# Patient Record
Sex: FEMALE
[De-identification: names, ages, dates, MRNs, and addresses within clinical notes are randomized per-mention and may not be internally consistent; named-entity substitution may affect disease eponyms.]

---

## 2018-10-02 ENCOUNTER — RESULT REVIEW (OUTPATIENT)
Age: 28
End: 2018-10-02

## 2020-01-28 ENCOUNTER — RESULT REVIEW (OUTPATIENT)
Age: 30
End: 2020-01-28

## 2021-02-02 ENCOUNTER — TRANSCRIPTION ENCOUNTER (OUTPATIENT)
Age: 31
End: 2021-02-02

## 2021-03-02 ENCOUNTER — TRANSCRIPTION ENCOUNTER (OUTPATIENT)
Age: 31
End: 2021-03-02

## 2021-03-16 ENCOUNTER — TRANSCRIPTION ENCOUNTER (OUTPATIENT)
Age: 31
End: 2021-03-16

## 2021-05-13 ENCOUNTER — RESULT REVIEW (OUTPATIENT)
Age: 31
End: 2021-05-13

## 2021-05-27 ENCOUNTER — RESULT REVIEW (OUTPATIENT)
Age: 31
End: 2021-05-27

## 2021-10-11 ENCOUNTER — TRANSCRIPTION ENCOUNTER (OUTPATIENT)
Age: 31
End: 2021-10-11

## 2022-02-24 PROBLEM — Z00.00 ENCOUNTER FOR PREVENTIVE HEALTH EXAMINATION: Status: ACTIVE | Noted: 2022-02-24

## 2022-03-24 ENCOUNTER — APPOINTMENT (OUTPATIENT)
Dept: ANTEPARTUM | Facility: CLINIC | Age: 32
End: 2022-03-24
Payer: COMMERCIAL

## 2022-03-24 ENCOUNTER — ASOB RESULT (OUTPATIENT)
Age: 32
End: 2022-03-24

## 2022-03-24 PROCEDURE — 76811 OB US DETAILED SNGL FETUS: CPT

## 2022-08-13 ENCOUNTER — TRANSCRIPTION ENCOUNTER (OUTPATIENT)
Age: 32
End: 2022-08-13

## 2022-08-14 ENCOUNTER — INPATIENT (INPATIENT)
Facility: HOSPITAL | Age: 32
LOS: 3 days | Discharge: HOME | End: 2022-08-18
Attending: OBSTETRICS & GYNECOLOGY | Admitting: OBSTETRICS & GYNECOLOGY
Payer: COMMERCIAL

## 2022-08-14 ENCOUNTER — TRANSCRIPTION ENCOUNTER (OUTPATIENT)
Age: 32
End: 2022-08-14

## 2022-08-14 VITALS
SYSTOLIC BLOOD PRESSURE: 142 MMHG | HEART RATE: 78 BPM | DIASTOLIC BLOOD PRESSURE: 77 MMHG | TEMPERATURE: 98 F | RESPIRATION RATE: 16 BRPM

## 2022-08-14 DIAGNOSIS — Z34.80 ENCOUNTER FOR SUPERVISION OF OTHER NORMAL PREGNANCY, UNSPECIFIED TRIMESTER: ICD-10-CM

## 2022-08-14 DIAGNOSIS — O26.899 OTHER SPECIFIED PREGNANCY RELATED CONDITIONS, UNSPECIFIED TRIMESTER: ICD-10-CM

## 2022-08-14 DIAGNOSIS — Z3A.00 WEEKS OF GESTATION OF PREGNANCY NOT SPECIFIED: ICD-10-CM

## 2022-08-14 LAB
ALBUMIN SERPL ELPH-MCNC: 3.3 G/DL — SIGNIFICANT CHANGE UP (ref 3.3–5)
ALBUMIN SERPL ELPH-MCNC: 3.6 G/DL — SIGNIFICANT CHANGE UP (ref 3.3–5)
ALBUMIN SERPL ELPH-MCNC: 3.9 G/DL — SIGNIFICANT CHANGE UP (ref 3.3–5)
ALP SERPL-CCNC: 214 U/L — HIGH (ref 40–120)
ALP SERPL-CCNC: 253 U/L — HIGH (ref 40–120)
ALP SERPL-CCNC: 256 U/L — HIGH (ref 40–120)
ALT FLD-CCNC: 13 U/L — SIGNIFICANT CHANGE UP (ref 10–45)
ALT FLD-CCNC: 13 U/L — SIGNIFICANT CHANGE UP (ref 10–45)
ALT FLD-CCNC: 15 U/L — SIGNIFICANT CHANGE UP (ref 10–45)
ANION GAP SERPL CALC-SCNC: 14 MMOL/L — SIGNIFICANT CHANGE UP (ref 5–17)
ANION GAP SERPL CALC-SCNC: 15 MMOL/L — SIGNIFICANT CHANGE UP (ref 5–17)
ANION GAP SERPL CALC-SCNC: 18 MMOL/L — HIGH (ref 5–17)
APPEARANCE UR: CLEAR — SIGNIFICANT CHANGE UP
APTT BLD: 25.4 SEC — LOW (ref 27.5–35.5)
APTT BLD: 25.9 SEC — LOW (ref 27.5–35.5)
APTT BLD: 27.6 SEC — SIGNIFICANT CHANGE UP (ref 27.5–35.5)
AST SERPL-CCNC: 26 U/L — SIGNIFICANT CHANGE UP (ref 10–40)
AST SERPL-CCNC: 26 U/L — SIGNIFICANT CHANGE UP (ref 10–40)
AST SERPL-CCNC: 36 U/L — SIGNIFICANT CHANGE UP (ref 10–40)
BACTERIA # UR AUTO: NEGATIVE — SIGNIFICANT CHANGE UP
BASOPHILS # BLD AUTO: 0.01 K/UL — SIGNIFICANT CHANGE UP (ref 0–0.2)
BASOPHILS # BLD AUTO: 0.02 K/UL — SIGNIFICANT CHANGE UP (ref 0–0.2)
BASOPHILS # BLD AUTO: 0.03 K/UL — SIGNIFICANT CHANGE UP (ref 0–0.2)
BASOPHILS NFR BLD AUTO: 0.1 % — SIGNIFICANT CHANGE UP (ref 0–2)
BASOPHILS NFR BLD AUTO: 0.2 % — SIGNIFICANT CHANGE UP (ref 0–2)
BASOPHILS NFR BLD AUTO: 0.4 % — SIGNIFICANT CHANGE UP (ref 0–2)
BILIRUB SERPL-MCNC: 0.5 MG/DL — SIGNIFICANT CHANGE UP (ref 0.2–1.2)
BILIRUB UR-MCNC: NEGATIVE — SIGNIFICANT CHANGE UP
BLD GP AB SCN SERPL QL: NEGATIVE — SIGNIFICANT CHANGE UP
BUN SERPL-MCNC: 12 MG/DL — SIGNIFICANT CHANGE UP (ref 7–23)
BUN SERPL-MCNC: 13 MG/DL — SIGNIFICANT CHANGE UP (ref 7–23)
BUN SERPL-MCNC: 9 MG/DL — SIGNIFICANT CHANGE UP (ref 7–23)
CALCIUM SERPL-MCNC: 8.5 MG/DL — SIGNIFICANT CHANGE UP (ref 8.4–10.5)
CALCIUM SERPL-MCNC: 8.8 MG/DL — SIGNIFICANT CHANGE UP (ref 8.4–10.5)
CALCIUM SERPL-MCNC: 9.1 MG/DL — SIGNIFICANT CHANGE UP (ref 8.4–10.5)
CHLORIDE SERPL-SCNC: 104 MMOL/L — SIGNIFICANT CHANGE UP (ref 96–108)
CHLORIDE SERPL-SCNC: 104 MMOL/L — SIGNIFICANT CHANGE UP (ref 96–108)
CHLORIDE SERPL-SCNC: 105 MMOL/L — SIGNIFICANT CHANGE UP (ref 96–108)
CO2 SERPL-SCNC: 17 MMOL/L — LOW (ref 22–31)
CO2 SERPL-SCNC: 18 MMOL/L — LOW (ref 22–31)
CO2 SERPL-SCNC: 20 MMOL/L — LOW (ref 22–31)
COLOR SPEC: SIGNIFICANT CHANGE UP
COVID-19 SPIKE DOMAIN AB INTERP: POSITIVE
COVID-19 SPIKE DOMAIN ANTIBODY RESULT: >250 U/ML — HIGH
CREAT ?TM UR-MCNC: 29 MG/DL — SIGNIFICANT CHANGE UP
CREAT SERPL-MCNC: 0.79 MG/DL — SIGNIFICANT CHANGE UP (ref 0.5–1.3)
CREAT SERPL-MCNC: 0.82 MG/DL — SIGNIFICANT CHANGE UP (ref 0.5–1.3)
CREAT SERPL-MCNC: 0.92 MG/DL — SIGNIFICANT CHANGE UP (ref 0.5–1.3)
DIFF PNL FLD: ABNORMAL
EGFR: 101 ML/MIN/1.73M2 — SIGNIFICANT CHANGE UP
EGFR: 84 ML/MIN/1.73M2 — SIGNIFICANT CHANGE UP
EGFR: 96 ML/MIN/1.73M2 — SIGNIFICANT CHANGE UP
EOSINOPHIL # BLD AUTO: 0 K/UL — SIGNIFICANT CHANGE UP (ref 0–0.5)
EOSINOPHIL # BLD AUTO: 0 K/UL — SIGNIFICANT CHANGE UP (ref 0–0.5)
EOSINOPHIL # BLD AUTO: 0.04 K/UL — SIGNIFICANT CHANGE UP (ref 0–0.5)
EOSINOPHIL NFR BLD AUTO: 0 % — SIGNIFICANT CHANGE UP (ref 0–6)
EOSINOPHIL NFR BLD AUTO: 0 % — SIGNIFICANT CHANGE UP (ref 0–6)
EOSINOPHIL NFR BLD AUTO: 0.5 % — SIGNIFICANT CHANGE UP (ref 0–6)
EPI CELLS # UR: 1 /HPF — SIGNIFICANT CHANGE UP
FIBRINOGEN PPP-MCNC: 609 MG/DL — HIGH (ref 330–520)
FIBRINOGEN PPP-MCNC: 637 MG/DL — HIGH (ref 330–520)
FIBRINOGEN PPP-MCNC: 647 MG/DL — HIGH (ref 330–520)
GLUCOSE SERPL-MCNC: 130 MG/DL — HIGH (ref 70–99)
GLUCOSE SERPL-MCNC: 151 MG/DL — HIGH (ref 70–99)
GLUCOSE SERPL-MCNC: 81 MG/DL — SIGNIFICANT CHANGE UP (ref 70–99)
GLUCOSE UR QL: NEGATIVE — SIGNIFICANT CHANGE UP
HCT VFR BLD CALC: 33.2 % — LOW (ref 34.5–45)
HCT VFR BLD CALC: 34.9 % — SIGNIFICANT CHANGE UP (ref 34.5–45)
HCT VFR BLD CALC: 35.3 % — SIGNIFICANT CHANGE UP (ref 34.5–45)
HGB BLD-MCNC: 10.9 G/DL — LOW (ref 11.5–15.5)
HGB BLD-MCNC: 11.7 G/DL — SIGNIFICANT CHANGE UP (ref 11.5–15.5)
HGB BLD-MCNC: 11.8 G/DL — SIGNIFICANT CHANGE UP (ref 11.5–15.5)
HYALINE CASTS # UR AUTO: 0 /LPF — SIGNIFICANT CHANGE UP (ref 0–2)
IMM GRANULOCYTES NFR BLD AUTO: 0.3 % — SIGNIFICANT CHANGE UP (ref 0–1.5)
IMM GRANULOCYTES NFR BLD AUTO: 0.4 % — SIGNIFICANT CHANGE UP (ref 0–1.5)
IMM GRANULOCYTES NFR BLD AUTO: 0.5 % — SIGNIFICANT CHANGE UP (ref 0–1.5)
INR BLD: 0.91 RATIO — SIGNIFICANT CHANGE UP (ref 0.88–1.16)
INR BLD: 0.92 RATIO — SIGNIFICANT CHANGE UP (ref 0.88–1.16)
INR BLD: 0.95 RATIO — SIGNIFICANT CHANGE UP (ref 0.88–1.16)
KETONES UR-MCNC: SIGNIFICANT CHANGE UP
LDH SERPL L TO P-CCNC: 233 U/L — SIGNIFICANT CHANGE UP (ref 50–242)
LDH SERPL L TO P-CCNC: 240 U/L — SIGNIFICANT CHANGE UP (ref 50–242)
LDH SERPL L TO P-CCNC: 411 U/L — HIGH (ref 50–242)
LEUKOCYTE ESTERASE UR-ACNC: NEGATIVE — SIGNIFICANT CHANGE UP
LYMPHOCYTES # BLD AUTO: 0.66 K/UL — LOW (ref 1–3.3)
LYMPHOCYTES # BLD AUTO: 1.35 K/UL — SIGNIFICANT CHANGE UP (ref 1–3.3)
LYMPHOCYTES # BLD AUTO: 1.38 K/UL — SIGNIFICANT CHANGE UP (ref 1–3.3)
LYMPHOCYTES # BLD AUTO: 17.1 % — SIGNIFICANT CHANGE UP (ref 13–44)
LYMPHOCYTES # BLD AUTO: 18.4 % — SIGNIFICANT CHANGE UP (ref 13–44)
LYMPHOCYTES # BLD AUTO: 4.9 % — LOW (ref 13–44)
MAGNESIUM SERPL-MCNC: 4.8 MG/DL — HIGH (ref 1.6–2.6)
MCHC RBC-ENTMCNC: 30.2 PG — SIGNIFICANT CHANGE UP (ref 27–34)
MCHC RBC-ENTMCNC: 30.4 PG — SIGNIFICANT CHANGE UP (ref 27–34)
MCHC RBC-ENTMCNC: 30.5 PG — SIGNIFICANT CHANGE UP (ref 27–34)
MCHC RBC-ENTMCNC: 32.8 GM/DL — SIGNIFICANT CHANGE UP (ref 32–36)
MCHC RBC-ENTMCNC: 33.4 GM/DL — SIGNIFICANT CHANGE UP (ref 32–36)
MCHC RBC-ENTMCNC: 33.5 GM/DL — SIGNIFICANT CHANGE UP (ref 32–36)
MCV RBC AUTO: 89.9 FL — SIGNIFICANT CHANGE UP (ref 80–100)
MCV RBC AUTO: 91.2 FL — SIGNIFICANT CHANGE UP (ref 80–100)
MCV RBC AUTO: 92.5 FL — SIGNIFICANT CHANGE UP (ref 80–100)
MONOCYTES # BLD AUTO: 0.37 K/UL — SIGNIFICANT CHANGE UP (ref 0–0.9)
MONOCYTES # BLD AUTO: 0.4 K/UL — SIGNIFICANT CHANGE UP (ref 0–0.9)
MONOCYTES # BLD AUTO: 0.45 K/UL — SIGNIFICANT CHANGE UP (ref 0–0.9)
MONOCYTES NFR BLD AUTO: 2.7 % — SIGNIFICANT CHANGE UP (ref 2–14)
MONOCYTES NFR BLD AUTO: 5 % — SIGNIFICANT CHANGE UP (ref 2–14)
MONOCYTES NFR BLD AUTO: 6.1 % — SIGNIFICANT CHANGE UP (ref 2–14)
NEUTROPHILS # BLD AUTO: 12.4 K/UL — HIGH (ref 1.8–7.4)
NEUTROPHILS # BLD AUTO: 5.44 K/UL — SIGNIFICANT CHANGE UP (ref 1.8–7.4)
NEUTROPHILS # BLD AUTO: 6.24 K/UL — SIGNIFICANT CHANGE UP (ref 1.8–7.4)
NEUTROPHILS NFR BLD AUTO: 74.1 % — SIGNIFICANT CHANGE UP (ref 43–77)
NEUTROPHILS NFR BLD AUTO: 77.3 % — HIGH (ref 43–77)
NEUTROPHILS NFR BLD AUTO: 92 % — HIGH (ref 43–77)
NITRITE UR-MCNC: NEGATIVE — SIGNIFICANT CHANGE UP
NRBC # BLD: 0 /100 WBCS — SIGNIFICANT CHANGE UP (ref 0–0)
PH UR: 6.5 — SIGNIFICANT CHANGE UP (ref 5–8)
PLATELET # BLD AUTO: 102 K/UL — LOW (ref 150–400)
PLATELET # BLD AUTO: 87 K/UL — LOW (ref 150–400)
PLATELET # BLD AUTO: 87 K/UL — LOW (ref 150–400)
POTASSIUM SERPL-MCNC: 3.2 MMOL/L — LOW (ref 3.5–5.3)
POTASSIUM SERPL-MCNC: 3.6 MMOL/L — SIGNIFICANT CHANGE UP (ref 3.5–5.3)
POTASSIUM SERPL-MCNC: 3.9 MMOL/L — SIGNIFICANT CHANGE UP (ref 3.5–5.3)
POTASSIUM SERPL-SCNC: 3.2 MMOL/L — LOW (ref 3.5–5.3)
POTASSIUM SERPL-SCNC: 3.6 MMOL/L — SIGNIFICANT CHANGE UP (ref 3.5–5.3)
POTASSIUM SERPL-SCNC: 3.9 MMOL/L — SIGNIFICANT CHANGE UP (ref 3.5–5.3)
PROT ?TM UR-MCNC: 14 MG/DL — HIGH (ref 0–12)
PROT SERPL-MCNC: 5.9 G/DL — LOW (ref 6–8.3)
PROT SERPL-MCNC: 6.3 G/DL — SIGNIFICANT CHANGE UP (ref 6–8.3)
PROT SERPL-MCNC: 7 G/DL — SIGNIFICANT CHANGE UP (ref 6–8.3)
PROT UR-MCNC: SIGNIFICANT CHANGE UP
PROT/CREAT UR-RTO: 0.5 RATIO — HIGH (ref 0–0.2)
PROTHROM AB SERPL-ACNC: 10.5 SEC — SIGNIFICANT CHANGE UP (ref 10.5–13.4)
PROTHROM AB SERPL-ACNC: 10.6 SEC — SIGNIFICANT CHANGE UP (ref 10.5–13.4)
PROTHROM AB SERPL-ACNC: 10.9 SEC — SIGNIFICANT CHANGE UP (ref 10.5–13.4)
RBC # BLD: 3.59 M/UL — LOW (ref 3.8–5.2)
RBC # BLD: 3.87 M/UL — SIGNIFICANT CHANGE UP (ref 3.8–5.2)
RBC # BLD: 3.88 M/UL — SIGNIFICANT CHANGE UP (ref 3.8–5.2)
RBC # FLD: 13.1 % — SIGNIFICANT CHANGE UP (ref 10.3–14.5)
RBC # FLD: 13.2 % — SIGNIFICANT CHANGE UP (ref 10.3–14.5)
RBC # FLD: 13.2 % — SIGNIFICANT CHANGE UP (ref 10.3–14.5)
RBC CASTS # UR COMP ASSIST: 1 /HPF — SIGNIFICANT CHANGE UP (ref 0–4)
RH IG SCN BLD-IMP: POSITIVE — SIGNIFICANT CHANGE UP
SARS-COV-2 IGG+IGM SERPL QL IA: >250 U/ML — HIGH
SARS-COV-2 IGG+IGM SERPL QL IA: POSITIVE
SARS-COV-2 RNA SPEC QL NAA+PROBE: SIGNIFICANT CHANGE UP
SODIUM SERPL-SCNC: 138 MMOL/L — SIGNIFICANT CHANGE UP (ref 135–145)
SODIUM SERPL-SCNC: 138 MMOL/L — SIGNIFICANT CHANGE UP (ref 135–145)
SODIUM SERPL-SCNC: 139 MMOL/L — SIGNIFICANT CHANGE UP (ref 135–145)
SP GR SPEC: 1 — LOW (ref 1.01–1.02)
T PALLIDUM AB TITR SER: NEGATIVE — SIGNIFICANT CHANGE UP
URATE SERPL-MCNC: 7.5 MG/DL — HIGH (ref 2.5–7)
URATE SERPL-MCNC: 7.5 MG/DL — HIGH (ref 2.5–7)
URATE SERPL-MCNC: 7.7 MG/DL — HIGH (ref 2.5–7)
UROBILINOGEN FLD QL: NEGATIVE — SIGNIFICANT CHANGE UP
WBC # BLD: 13.48 K/UL — HIGH (ref 3.8–10.5)
WBC # BLD: 7.35 K/UL — SIGNIFICANT CHANGE UP (ref 3.8–10.5)
WBC # BLD: 8.07 K/UL — SIGNIFICANT CHANGE UP (ref 3.8–10.5)
WBC # FLD AUTO: 13.48 K/UL — HIGH (ref 3.8–10.5)
WBC # FLD AUTO: 7.35 K/UL — SIGNIFICANT CHANGE UP (ref 3.8–10.5)
WBC # FLD AUTO: 8.07 K/UL — SIGNIFICANT CHANGE UP (ref 3.8–10.5)
WBC UR QL: 1 /HPF — SIGNIFICANT CHANGE UP (ref 0–5)

## 2022-08-14 PROCEDURE — 88307 TISSUE EXAM BY PATHOLOGIST: CPT | Mod: 26

## 2022-08-14 RX ORDER — OXYTOCIN 10 UNIT/ML
333.33 VIAL (ML) INJECTION
Qty: 20 | Refills: 0 | Status: COMPLETED | OUTPATIENT
Start: 2022-08-14 | End: 2022-08-14

## 2022-08-14 RX ORDER — ONDANSETRON 8 MG/1
4 TABLET, FILM COATED ORAL EVERY 6 HOURS
Refills: 0 | Status: DISCONTINUED | OUTPATIENT
Start: 2022-08-14 | End: 2022-08-18

## 2022-08-14 RX ORDER — LANOLIN
1 OINTMENT (GRAM) TOPICAL EVERY 6 HOURS
Refills: 0 | Status: DISCONTINUED | OUTPATIENT
Start: 2022-08-14 | End: 2022-08-18

## 2022-08-14 RX ORDER — CITRIC ACID/SODIUM CITRATE 300-500 MG
15 SOLUTION, ORAL ORAL EVERY 6 HOURS
Refills: 0 | Status: DISCONTINUED | OUTPATIENT
Start: 2022-08-14 | End: 2022-08-14

## 2022-08-14 RX ORDER — DEXAMETHASONE 0.5 MG/5ML
4 ELIXIR ORAL EVERY 6 HOURS
Refills: 0 | Status: DISCONTINUED | OUTPATIENT
Start: 2022-08-14 | End: 2022-08-18

## 2022-08-14 RX ORDER — SIMETHICONE 80 MG/1
80 TABLET, CHEWABLE ORAL EVERY 4 HOURS
Refills: 0 | Status: DISCONTINUED | OUTPATIENT
Start: 2022-08-14 | End: 2022-08-18

## 2022-08-14 RX ORDER — CHLORHEXIDINE GLUCONATE 213 G/1000ML
1 SOLUTION TOPICAL ONCE
Refills: 0 | Status: DISCONTINUED | OUTPATIENT
Start: 2022-08-14 | End: 2022-08-14

## 2022-08-14 RX ORDER — MAGNESIUM HYDROXIDE 400 MG/1
30 TABLET, CHEWABLE ORAL
Refills: 0 | Status: DISCONTINUED | OUTPATIENT
Start: 2022-08-14 | End: 2022-08-18

## 2022-08-14 RX ORDER — SODIUM CHLORIDE 9 MG/ML
1000 INJECTION, SOLUTION INTRAVENOUS
Refills: 0 | Status: DISCONTINUED | OUTPATIENT
Start: 2022-08-14 | End: 2022-08-15

## 2022-08-14 RX ORDER — TETANUS TOXOID, REDUCED DIPHTHERIA TOXOID AND ACELLULAR PERTUSSIS VACCINE, ADSORBED 5; 2.5; 8; 8; 2.5 [IU]/.5ML; [IU]/.5ML; UG/.5ML; UG/.5ML; UG/.5ML
0.5 SUSPENSION INTRAMUSCULAR ONCE
Refills: 0 | Status: DISCONTINUED | OUTPATIENT
Start: 2022-08-14 | End: 2022-08-18

## 2022-08-14 RX ORDER — SODIUM CHLORIDE 9 MG/ML
1000 INJECTION, SOLUTION INTRAVENOUS
Refills: 0 | Status: DISCONTINUED | OUTPATIENT
Start: 2022-08-14 | End: 2022-08-14

## 2022-08-14 RX ORDER — MORPHINE SULFATE 50 MG/1
2 CAPSULE, EXTENDED RELEASE ORAL ONCE
Refills: 0 | Status: DISCONTINUED | OUTPATIENT
Start: 2022-08-14 | End: 2022-08-15

## 2022-08-14 RX ORDER — ACETAMINOPHEN 500 MG
1000 TABLET ORAL EVERY 6 HOURS
Refills: 0 | Status: DISCONTINUED | OUTPATIENT
Start: 2022-08-14 | End: 2022-08-18

## 2022-08-14 RX ORDER — MAGNESIUM SULFATE 500 MG/ML
4 VIAL (ML) INJECTION ONCE
Refills: 0 | Status: DISCONTINUED | OUTPATIENT
Start: 2022-08-14 | End: 2022-08-14

## 2022-08-14 RX ORDER — HEPARIN SODIUM 5000 [USP'U]/ML
5000 INJECTION INTRAVENOUS; SUBCUTANEOUS EVERY 12 HOURS
Refills: 0 | Status: DISCONTINUED | OUTPATIENT
Start: 2022-08-14 | End: 2022-08-18

## 2022-08-14 RX ORDER — MORPHINE SULFATE 50 MG/1
2 CAPSULE, EXTENDED RELEASE ORAL ONCE
Refills: 0 | Status: DISCONTINUED | OUTPATIENT
Start: 2022-08-14 | End: 2022-08-14

## 2022-08-14 RX ORDER — OXYTOCIN 10 UNIT/ML
333.33 VIAL (ML) INJECTION
Qty: 20 | Refills: 0 | Status: DISCONTINUED | OUTPATIENT
Start: 2022-08-14 | End: 2022-08-18

## 2022-08-14 RX ORDER — OXYTOCIN 10 UNIT/ML
4 VIAL (ML) INJECTION
Qty: 30 | Refills: 0 | Status: DISCONTINUED | OUTPATIENT
Start: 2022-08-14 | End: 2022-08-15

## 2022-08-14 RX ORDER — OXYCODONE HYDROCHLORIDE 5 MG/1
5 TABLET ORAL ONCE
Refills: 0 | Status: DISCONTINUED | OUTPATIENT
Start: 2022-08-14 | End: 2022-08-18

## 2022-08-14 RX ORDER — DIPHENHYDRAMINE HCL 50 MG
25 CAPSULE ORAL EVERY 6 HOURS
Refills: 0 | Status: DISCONTINUED | OUTPATIENT
Start: 2022-08-14 | End: 2022-08-18

## 2022-08-14 RX ORDER — ACETAMINOPHEN 500 MG
975 TABLET ORAL EVERY 6 HOURS
Refills: 0 | Status: COMPLETED | OUTPATIENT
Start: 2022-08-14 | End: 2023-07-13

## 2022-08-14 RX ORDER — MAGNESIUM SULFATE 500 MG/ML
2 VIAL (ML) INJECTION
Qty: 40 | Refills: 0 | Status: DISCONTINUED | OUTPATIENT
Start: 2022-08-14 | End: 2022-08-14

## 2022-08-14 RX ORDER — NALOXONE HYDROCHLORIDE 4 MG/.1ML
0.1 SPRAY NASAL
Refills: 0 | Status: DISCONTINUED | OUTPATIENT
Start: 2022-08-14 | End: 2022-08-18

## 2022-08-14 RX ORDER — OXYCODONE HYDROCHLORIDE 5 MG/1
5 TABLET ORAL
Refills: 0 | Status: DISCONTINUED | OUTPATIENT
Start: 2022-08-14 | End: 2022-08-18

## 2022-08-14 RX ADMIN — Medication 50 GM/HR: at 15:44

## 2022-08-14 RX ADMIN — Medication 4 MILLIUNIT(S)/MIN: at 09:57

## 2022-08-14 RX ADMIN — MORPHINE SULFATE 2 MILLIGRAM(S): 50 CAPSULE, EXTENDED RELEASE ORAL at 16:21

## 2022-08-14 RX ADMIN — Medication 1000 MILLIUNIT(S)/MIN: at 15:50

## 2022-08-14 NOTE — DISCHARGE NOTE OB - MEDICATION SUMMARY - MEDICATIONS TO TAKE
I will START or STAY ON the medications listed below when I get home from the hospital:    oxyCODONE 5 mg oral tablet  -- 1 tab(s) by mouth every 8 hours MDD:Do not exceed more than 3 doses per day   -- Caution federal law prohibits the transfer of this drug to any person other  than the person for whom it was prescribed.  It is very important that you take or use this exactly as directed.  Do not skip doses or discontinue unless directed by your doctor.  May cause drowsiness or dizziness.  This prescription cannot be refilled.  Using more of this medication than prescribed may cause serious breathing problems.    -- Indication: For Postpartum    I will START or STAY ON the medications listed below when I get home from the hospital:    blood pressure cuff  -- Take blood pressures 2x per day (morning and night) and record   Call MD if Blood pressures above 140/90   -- Indication: For hypertension    oxyCODONE 5 mg oral tablet  -- 1 tab(s) by mouth every 8 hours MDD:Do not exceed more than 3 doses per day   -- Caution federal law prohibits the transfer of this drug to any person other  than the person for whom it was prescribed.  It is very important that you take or use this exactly as directed.  Do not skip doses or discontinue unless directed by your doctor.  May cause drowsiness or dizziness.  This prescription cannot be refilled.  Using more of this medication than prescribed may cause serious breathing problems.    -- Indication: For Postpartum      mg oral tablet  -- 1 tab(s) by mouth every 6 hours  -- Indication: For pain    NIFEdipine 30 mg oral tablet, extended release  -- 1 tab(s) by mouth once a day  -- Indication: For htn

## 2022-08-14 NOTE — OB RN PATIENT PROFILE - INFANT HOME WITH MOTHER, OB PROFILE
Fluconazole Counseling:  Patient counseled regarding adverse effects of fluconazole including but not limited to headache, diarrhea, nausea, upset stomach, liver function test abnormalities, taste disturbance, and stomach pain.  There is a rare possibility of liver failure that can occur when taking fluconazole.  The patient understands that monitoring of LFTs and kidney function test may be required, especially at baseline. The patient verbalized understanding of the proper use and possible adverse effects of fluconazole.  All of the patient's questions and concerns were addressed. yes

## 2022-08-14 NOTE — DISCHARGE NOTE OB - MATERIALS PROVIDED
Guide to Postpartum Care/Shaken Baby Prevention Handout/Breastfeeding Guide and Packet/Birth Certificate Instructions

## 2022-08-14 NOTE — OB NEONATOLOGY/PEDIATRICIAN DELIVERY SUMMARY - NS_NEWBORNACONDIT_OBGYN_ALL_OB
06/13/22 1559   SLP Deferred Reason   SLP Deferred Reason Patient unavailable for evaluation  (Attempted to see pt for eval this pm however pt off the floor for other procedure. Will defer and f/u tomorrow)     
Liveborn

## 2022-08-14 NOTE — OB RN DELIVERY SUMMARY - NSSELHIDDEN_OBGYN_ALL_OB_FT
[NS_DeliveryAttending1_OBGYN_ALL_OB_FT:[id number]] [NS_DeliveryAttending1_OBGYN_ALL_OB_FT:[id number]=],[NS_DeliveryAssist1_OBGYN_ALL_OB_FT:[id number]=]

## 2022-08-14 NOTE — DISCHARGE NOTE OB - CARE PROVIDER_API CALL
Wade Marte  OBSTETRICS AND GYNECOLOGY  [address], [address]  [place] [address]  Phone: [phone]  Fax: [phone]  Follow Up Time:

## 2022-08-14 NOTE — OB PROVIDER LABOR PROGRESS NOTE - NS_SUBJECTIVE/OBJECTIVE_OBGYN_ALL_OB_FT
Written postpartum secondary to clinical duties.   CTSP for a prolonged deceleration in FHR.   I informed resident, anesthesia and charge nurse immediately that I wanted to proceed with a primary  delivery as an urgent case. The fetal heart rate returned to baseline after 2-3 minutes with terbutaline x1. This was the second time we gave her terbutaline in her labor. Patient was aware she was likely going to have a  as there was cervical edema and she was 8 cm for 2.5 hours already. She was agreeable.  We moved the patient to the OR and epidural was dosed.  was aware of the situation as well.
PA Note:  Patient seen and evaluated at bedside. Patient comfortable with epidural in place.
PA Note:  Patient seen and evaluated at bedside. Patient c/o increased pain.
R4 OB Labor Note    Pt checked for prolonged decel.  Pit discontinued. Pt positions changed.  Terb x1 administered.    Vital Signs Last 24 Hrs  T(C): 36.6 (14 Aug 2022 11:57), Max: 36.7 (14 Aug 2022 06:20)  T(F): 97.88 (14 Aug 2022 11:57), Max: 98.1 (14 Aug 2022 06:20)  HR: 95 (14 Aug 2022 12:46) (63 - 144)  BP: 112/65 (14 Aug 2022 12:31) (105/59 - 166/92)  BP(mean): --  RR: 16 (14 Aug 2022 07:51) (16 - 16)  SpO2: 96% (14 Aug 2022 12:46) (87% - 100%)    Parameters below as of 14 Aug 2022 06:58  Patient On (Oxygen Delivery Method): room air

## 2022-08-14 NOTE — DISCHARGE NOTE OB - PATIENT PORTAL LINK FT
You can access the FollowMyHealth Patient Portal offered by [facility] by registering at the following website: [email or web address]. By joining [facility]’s FollowMyHealth portal, you will also be able to view your health information using other applications (apps) compatible with our system.

## 2022-08-14 NOTE — DISCHARGE NOTE OB - PLAN OF CARE
POD 2 s/p  delivery for prolonged fetal decels and preeclampsia with severe features. She is stable for discharge on POD POD 2 s/p  delivery for prolonged fetal decels and preeclampsia with severe features. She is stable for discharge on POD #3 POD 2 s/p  delivery for prolonged fetal decels and preeclampsia with severe features. She is stable for discharge on POD #4

## 2022-08-14 NOTE — DISCHARGE NOTE OB - BREAST MILK PROVIDES COLOSTRUM THAT IS HIGH IN PROTEIN
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 Other past medical history includes hypothyroidism, previous DVT left internal jugular 2020, breast lump with benign US/biopsy. Patient was notified as a possible DCD organ became available and presented pre-transplant.  Patient was informed of the risks and benefits regarding  donor organ transplantation, and has elected to proceed.      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 Other past medical history includes hypothyroidism, previous DVT left internal jugular 2020, breast lump with benign US/biopsy.     At the time of admission, the patient reports being in her usual state of health. No recent hospitalizations, no concerns at this time. Denies fevers, chills, cough, upper respiratory symptoms, bowel or bladder changes, or other concerns. Of note, received second dose of COVID vaccine 1 month ago.     Past Medical  History    Past Medical History:   Diagnosis Date     Ascites      History of blood transfusion      Liver cirrhosis (H)      Thyroid disease        Past Surgical History   Past Surgical History:   Procedure Laterality Date      SECTION  2009     THORACENTESIS Left 2021    Procedure: THORACENTESIS;  Surgeon: Almas Irby MD;  Location: [facility]     THORACENTESIS N/A 2021    Procedure: THORACENTESIS;  Surgeon: Almas Irby MD;  Location: [facility]     THORACENTESIS N/A 03/10/2021    Procedure: THORACENTESIS;  Surgeon: Almas Irby MD;  Location: [facility]       Prior to Admission Medications   Cannot display prior to admission medications because the patient has not been admitted in this contact.     Allergies   Allergies   Allergen Reactions     Amoxicillin GI Disturbance, Diarrhea, Nausea and Nausea and Vomiting       Social History    Nonsmoker  Previous etoh use    Family History   I have reviewed this patient's family history and updated it with pertinent information if needed.   Family History   Problem Relation Age of Onset     No Known Problems Mother      Colon Cancer Father 58         age 63     Breast Cancer Maternal Grandmother         Diagnosed in her 60's     No Known Problems Maternal Grandfather      No Known Problems Paternal Grandmother      No Known Problems Paternal Grandfather      Substance Abuse Brother      No Known Problems Sister      No Known Problems Son      Substance Abuse Brother        Review of Systems   The 10 point Review of Systems is negative other than noted in the HPI or here.     Physical Exam                      Vital Signs with Ranges  Temp:  [98.1  F (36.7  C)] 98.1  F (36.7  C)  Pulse:  [99] 99  Resp:  [16] 16  BP: (130)/(58) 130/58  SpO2:  [98 %] 98 %  0 lbs 0 oz    Constitutional: awake, alert, sitting up at edge of bed, NAD  HEENT: normocephalic, EOMI, MMM  Respiratory: nonlabored breathing on room air  Cardiovascular: RRR  GI: soft, nondistended, nontender to  palpation, lower abdominal scar noted  Skin: WWP  Musculoskeletal: moves all extremities  Neurologic: AOx3, no focal deficit  Neuropsychiatric: pleasant, appropriate     Statement Selected

## 2022-08-14 NOTE — DISCHARGE NOTE OB - CARE PLAN
Principal Discharge DX:	Normal labor  Assessment and plan of treatment:	POD 2 s/p  delivery for prolonged fetal decels and preeclampsia with severe features. She is stable for discharge on POD   1 Principal Discharge DX:	Normal labor  Assessment and plan of treatment:	POD 2 s/p  delivery for prolonged fetal decels and preeclampsia with severe features. She is stable for discharge on POD #3   Principal Discharge DX:	Normal labor  Assessment and plan of treatment:	POD 2 s/p  delivery for prolonged fetal decels and preeclampsia with severe features. She is stable for discharge on POD #4

## 2022-08-14 NOTE — DISCHARGE NOTE OB - HOSPITAL COURSE
Patient was admitted in labor. During the course of her labor she developed mild range BPS. She also had decreasing platelets to define preeclampsia with severe features. She had a primary  for prolonged fetal decels  at 8 cm. She was on magnesium for 24 hours postpartum.  Patient was admitted in labor. During the course of her labor she developed mild range BPS. She also had decreasing platelets to define preeclampsia with severe features. She had a primary  for prolonged fetal decels  at 8 cm. She was on magnesium for 24 hours postpartum.   Vital signs remained stable without severe BP elevations and pain well controlled.   Tolerating diet, ambulating and voiding spontaneously.   D/c home on POD #3 with return precautions and blood pressure cuff   Patient to return to office in one week for BP check  Patient was admitted in labor. During the course of her labor she developed mild range BPS. She also had decreasing platelets to define preeclampsia with severe features. She had a primary  for prolonged fetal decels  at 8 cm. She was on magnesium for 24 hours postpartum.   Vital signs remained stable without severe BP elevations and pain well controlled.   Tolerating diet, ambulating and voiding spontaneously.   D/c home on POD #4 with return precautions and blood pressure cuff   Patient to return to office in one week for BP check

## 2022-08-14 NOTE — OB PROVIDER DELIVERY SUMMARY - NSSELHIDDEN_OBGYN_ALL_OB_FT
[NS_DeliveryAttending1_OBGYN_ALL_OB_FT:[id number]],[NS_DeliveryAssist1_OBGYN_ALL_OB_FT:[id number]=]

## 2022-08-14 NOTE — OB PROVIDER LABOR PROGRESS NOTE - ASSESSMENT
c/w resuscitative measures  restart pit when tracing improves    Nathalie R4 #labor  c/w resuscitative measures  restart pit when tracing improves    #gHTN  - pt meeting criteria by BP  - f/u P/C  - Plt 102, consider repeat    Nathalie R4

## 2022-08-14 NOTE — OB PROVIDER LABOR PROGRESS NOTE - NS_OBIHIFHRDETAILS_OBGYN_ALL_OB_FT
145/moderate variability/+accels/no decels
recovered to 130/mod/
120/moderate variability/+accels/no decels

## 2022-08-14 NOTE — OB PROVIDER H&P - ASSESSMENT
Assessment  – 33yo  @40wk presents for chief concern of ctx q5min. SVE 2.5/70/-3, patinet in early labor. No prenatal issues. GBS neg.    Plan  1. Admit to LND. Routine Labs. IVF.  2. Expectant management. Repeat VE in 2hr. If exam unchanged, will start augmentation w/ pitocin.  3. Fetus: cat 1 tracing. VTX. EFW 3800g by sono. Continuous EFM. Sono. No concerns.  4. Prenatal issues: none  5. GBS neg  6. Pain: IV pain meds/epidural PRN  7. Mild range BPs noted on presentation to triage. HELLP labs sent    Patient discussed with attending physician, Dr. Marte.  Rosa Chahal MD PGY3

## 2022-08-14 NOTE — OB NEONATOLOGY/PEDIATRICIAN DELIVERY SUMMARY - NSPEDSNEONOTESA_OBGYN_ALL_OB_FT
Baby boy born at 40+0 wks via CS for bradycardia to a 33 y/o  AB+ blood type mother. Maternal history of hypothyroidism not on medications, preeclampsia on continuous Mg during admission. No significant prenatal history. PNL nr/immune/-, GBS- on . SROM at 10:26 with clear fluids. Baby emerged vigorous, crying, was warmed, dried, suctioned, and stimulated with APGARS of 9/9. Mom would like to breast and bottle feed, consents Hep B and consents circ. EOS 0.06. Highest maternal temp 36.6.     Physical Exam:  Gen: no acute distress, +grimace  HEENT:  anterior fontanel open soft and flat, nondysmorphic facies, no cleft lip/palate, ears normal set, no ear pits or tags, nares clinically patent  Resp: Normal respiratory effort without grunting or retractions, good air entry b/l, clear to auscultation bilaterally  Cardio: Present S1/S2, regular rate and rhythm, no murmurs  Abd: soft, non tender, non distended, umbilical cord with 3 vessels  Neuro: +palmar and plantar grasp, +suck, +andrea, normal tone  Extremities: negative dolan and ortolani maneuvers, moving all extremities, no clavicular crepitus or stepoff  Skin: pink, warm  Genitals: Normal male anatomy, testicles palpable in scrotum b/l, Mo 1, anus patent

## 2022-08-14 NOTE — DISCHARGE NOTE OB - ADDITIONAL INSTRUCTIONS
Regular diet as tolerated, regular activity as tolerated, no heavy lifting for first two weeks.  Nothing per vagina: no intercourse, tampons or douching.  Call your provider if you experience fevers, chills, worsening abdominal pain, inability to urinate or worsening vaginal bleeding. Follow up with your provider in 1 week for a blood pressure check. Regular diet as tolerated, regular activity as tolerated, no heavy lifting for first two weeks.  Nothing per vagina: no intercourse, tampons or douching.  Call your provider if you experience fevers, chills, worsening abdominal pain, inability to urinate or worsening vaginal bleeding. Follow up with your provider in 1 week for a blood pressure check.  Call provider if /70 or < or 150/100 or >.  Call if having : blurred vision, severe headaches, nausea/vomiting, upper abdominal pain, elevated BPs

## 2022-08-14 NOTE — OB RN INTRAOPERATIVE NOTE - NSSELHIDDEN_OBGYN_ALL_OB_FT
[NS_DeliveryAttending1_OBGYN_ALL_OB_FT:[id number]] [NS_DeliveryAttending1_OBGYN_ALL_OB_FT:[id number]],[NS_DeliveryAssist1_OBGYN_ALL_OB_FT:[id number]=]

## 2022-08-14 NOTE — OB PROVIDER LABOR PROGRESS NOTE - ASSESSMENT
A/P:  -EFM/[place]  -Anesthesia notified for epidural redose  -Anticipate   Dr. Marte in house  Kesha Russo PA-C

## 2022-08-14 NOTE — DISCHARGE NOTE OB - NS MD DC FALL RISK RISK
For information on Fall & Injury Prevention, visit: [email or web address] OR  [email or web address] OR  https://www.cdc.gov/steadi/patient.html

## 2022-08-14 NOTE — OB PROVIDER H&P - ATTENDING COMMENTS
32yoF  @40wk presents for evaluation of Ctx that started this morning and are occurring q5min. +FM. -LOF. -VB. Pt denies any other concerns.    VE: /-2 RIYA, SROM -clear at 10:26 AM  ctx q 4 min  EFM: FHR reactive, moderate variability, accels present no decels  A/P: Labor augmentation at 40 weeks  Neg GBS  Epidural working well  pitocin augmentation  Wade Marte MD

## 2022-08-14 NOTE — OB PROVIDER H&P - NSHPPHYSICALEXAM_GEN_ALL_CORE
Objective  – Vital Signs  Vital Signs Last 24 Hrs  T(C): 36.7 (14 Aug 2022 06:20), Max: 36.7 (14 Aug 2022 06:20)  T(F): 98.1 (14 Aug 2022 06:20), Max: 98.1 (14 Aug 2022 06:20)  HR: 80 (14 Aug 2022 06:57) (69 - 86)  BP: 147/82 (14 Aug 2022 06:45) (142/77 - 147/82)  BP(mean): --  RR: 16 (14 Aug 2022 06:20) (16 - 16)  SpO2: 87% (14 Aug 2022 06:57) (87% - 96%)    – PE:   CV: RRR  Pulm: breathing comfortably on RA  Abd: gravid, nontender  Extr: moving all extremities with ease  – VE: 2.5/70/-3  – FHT: baseline 130, mod variability, +accels, -decels  – [place]: q3-5min  – EFW: 3800g by sono  – Sono: vertex

## 2022-08-14 NOTE — OB RN DELIVERY SUMMARY - NS_SEPSISRSKCALC_OBGYN_ALL_OB_FT
EOS calculated successfully. EOS Risk Factor: 0.5/1000 live births ([facility] national incidence); GA=40w;Temp=98.1; ROM=5.383; GBS='Negative'; Antibiotics='No antibiotics or any antibiotics < 2 hrs prior to birth'
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
## 2022-08-14 NOTE — OB PROVIDER DELIVERY SUMMARY - NSPROVIDERDELIVERYNOTE_OBGYN_ALL_OB_FT
viable infant, cephalic OP presentation, weight 3520g, APGARS 9/9  grossly normal uters, b/l tubes and ovaries  hysterotomy closed in 2 layers  rectus muscle reapproximated with chromic suture    754/1700/300    Dictation #: viable infant, cephalic OP presentation, weight 3520g, APGARS 9/9  grossly normal uters, b/l tubes and ovaries  hysterotomy closed in 2 layers  rectus muscle reapproximated with chromic suture    394/1700/300    Dictation #: [id number]

## 2022-08-14 NOTE — OB PROVIDER H&P - HISTORY OF PRESENT ILLNESS
Admission H&P    Subjective  HPI: 32yoF  @40wk presents for evaluation of Ctx that started this morning and are occurring q5min. +FM. -LOF. -VB. Pt denies any other concerns.    – PNC: Reports diagnosis of gestational hypothyroid, not on meds. GBS neg.  EFW 3800g by caitie.  – OBHx: primigravida  – GynHx: remote h/o abnormal pap s/p colposcopy, pap in this pregnancy wnl, denies h/o STIs, ovarian cysts or fibroids  – PMH: denies  – PSH: denies  – Psych: denies   – Social: denies   – Meds: PNV   – Allergies: NKDA  – Will accept blood transfusions? Yes

## 2022-08-14 NOTE — OB PROVIDER DELIVERY SUMMARY - NSPPHNORISK_OBGYN_ALL_OB
After evaluating the patient it has been determined they are at risk for postpartum hemorrhage.
Awake/Alert

## 2022-08-15 LAB
ALBUMIN SERPL ELPH-MCNC: 2.7 G/DL — LOW (ref 3.3–5)
ALP SERPL-CCNC: 184 U/L — HIGH (ref 40–120)
ALT FLD-CCNC: 11 U/L — SIGNIFICANT CHANGE UP (ref 10–45)
ANION GAP SERPL CALC-SCNC: 12 MMOL/L — SIGNIFICANT CHANGE UP (ref 5–17)
APTT BLD: 24.9 SEC — LOW (ref 27.5–35.5)
AST SERPL-CCNC: 33 U/L — SIGNIFICANT CHANGE UP (ref 10–40)
BASOPHILS # BLD AUTO: 0.01 K/UL — SIGNIFICANT CHANGE UP (ref 0–0.2)
BASOPHILS NFR BLD AUTO: 0.1 % — SIGNIFICANT CHANGE UP (ref 0–2)
BILIRUB SERPL-MCNC: 0.6 MG/DL — SIGNIFICANT CHANGE UP (ref 0.2–1.2)
BUN SERPL-MCNC: 8 MG/DL — SIGNIFICANT CHANGE UP (ref 7–23)
CALCIUM SERPL-MCNC: 7.9 MG/DL — LOW (ref 8.4–10.5)
CHLORIDE SERPL-SCNC: 102 MMOL/L — SIGNIFICANT CHANGE UP (ref 96–108)
CO2 SERPL-SCNC: 22 MMOL/L — SIGNIFICANT CHANGE UP (ref 22–31)
CREAT SERPL-MCNC: 0.78 MG/DL — SIGNIFICANT CHANGE UP (ref 0.5–1.3)
EGFR: 102 ML/MIN/1.73M2 — SIGNIFICANT CHANGE UP
EOSINOPHIL # BLD AUTO: 0 K/UL — SIGNIFICANT CHANGE UP (ref 0–0.5)
EOSINOPHIL NFR BLD AUTO: 0 % — SIGNIFICANT CHANGE UP (ref 0–6)
FIBRINOGEN PPP-MCNC: 609 MG/DL — HIGH (ref 330–520)
GLUCOSE SERPL-MCNC: 109 MG/DL — HIGH (ref 70–99)
HCT VFR BLD CALC: 28.3 % — LOW (ref 34.5–45)
HGB BLD-MCNC: 9.7 G/DL — LOW (ref 11.5–15.5)
IMM GRANULOCYTES NFR BLD AUTO: 0.4 % — SIGNIFICANT CHANGE UP (ref 0–1.5)
INR BLD: 0.93 RATIO — SIGNIFICANT CHANGE UP (ref 0.88–1.16)
LDH SERPL L TO P-CCNC: 393 U/L — HIGH (ref 50–242)
LYMPHOCYTES # BLD AUTO: 1.18 K/UL — SIGNIFICANT CHANGE UP (ref 1–3.3)
LYMPHOCYTES # BLD AUTO: 9.7 % — LOW (ref 13–44)
MAGNESIUM SERPL-MCNC: 6.7 MG/DL — HIGH (ref 1.6–2.6)
MAGNESIUM SERPL-MCNC: 7.4 MG/DL — HIGH (ref 1.6–2.6)
MCHC RBC-ENTMCNC: 30.5 PG — SIGNIFICANT CHANGE UP (ref 27–34)
MCHC RBC-ENTMCNC: 34.3 GM/DL — SIGNIFICANT CHANGE UP (ref 32–36)
MCV RBC AUTO: 89 FL — SIGNIFICANT CHANGE UP (ref 80–100)
MONOCYTES # BLD AUTO: 0.76 K/UL — SIGNIFICANT CHANGE UP (ref 0–0.9)
MONOCYTES NFR BLD AUTO: 6.2 % — SIGNIFICANT CHANGE UP (ref 2–14)
NEUTROPHILS # BLD AUTO: 10.22 K/UL — HIGH (ref 1.8–7.4)
NEUTROPHILS NFR BLD AUTO: 83.6 % — HIGH (ref 43–77)
NRBC # BLD: 0 /100 WBCS — SIGNIFICANT CHANGE UP (ref 0–0)
PLATELET # BLD AUTO: 89 K/UL — LOW (ref 150–400)
POTASSIUM SERPL-MCNC: 3.9 MMOL/L — SIGNIFICANT CHANGE UP (ref 3.5–5.3)
POTASSIUM SERPL-SCNC: 3.9 MMOL/L — SIGNIFICANT CHANGE UP (ref 3.5–5.3)
PROT SERPL-MCNC: 5.2 G/DL — LOW (ref 6–8.3)
PROTHROM AB SERPL-ACNC: 10.7 SEC — SIGNIFICANT CHANGE UP (ref 10.5–13.4)
RBC # BLD: 3.18 M/UL — LOW (ref 3.8–5.2)
RBC # FLD: 13.2 % — SIGNIFICANT CHANGE UP (ref 10.3–14.5)
SODIUM SERPL-SCNC: 136 MMOL/L — SIGNIFICANT CHANGE UP (ref 135–145)
URATE SERPL-MCNC: 7.6 MG/DL — HIGH (ref 2.5–7)
WBC # BLD: 12.22 K/UL — HIGH (ref 3.8–10.5)
WBC # FLD AUTO: 12.22 K/UL — HIGH (ref 3.8–10.5)

## 2022-08-15 RX ORDER — MAGNESIUM SULFATE 500 MG/ML
1 VIAL (ML) INJECTION
Qty: 40 | Refills: 0 | Status: DISCONTINUED | OUTPATIENT
Start: 2022-08-15 | End: 2022-08-15

## 2022-08-15 RX ORDER — IBUPROFEN 200 MG
600 TABLET ORAL EVERY 6 HOURS
Refills: 0 | Status: DISCONTINUED | OUTPATIENT
Start: 2022-08-15 | End: 2022-08-18

## 2022-08-15 RX ORDER — SODIUM CHLORIDE 9 MG/ML
1000 INJECTION, SOLUTION INTRAVENOUS
Refills: 0 | Status: DISCONTINUED | OUTPATIENT
Start: 2022-08-15 | End: 2022-08-18

## 2022-08-15 RX ORDER — ACETAMINOPHEN 500 MG
975 TABLET ORAL EVERY 6 HOURS
Refills: 0 | Status: DISCONTINUED | OUTPATIENT
Start: 2022-08-15 | End: 2022-08-18

## 2022-08-15 RX ADMIN — Medication 600 MILLIGRAM(S): at 21:58

## 2022-08-15 RX ADMIN — Medication 600 MILLIGRAM(S): at 22:40

## 2022-08-15 RX ADMIN — Medication 400 MILLIGRAM(S): at 12:11

## 2022-08-15 RX ADMIN — Medication 400 MILLIGRAM(S): at 06:05

## 2022-08-15 RX ADMIN — Medication 600 MILLIGRAM(S): at 16:30

## 2022-08-15 RX ADMIN — Medication 600 MILLIGRAM(S): at 10:03

## 2022-08-15 RX ADMIN — Medication 400 MILLIGRAM(S): at 00:17

## 2022-08-15 RX ADMIN — Medication 1000 MILLIGRAM(S): at 01:00

## 2022-08-15 RX ADMIN — Medication 600 MILLIGRAM(S): at 09:31

## 2022-08-15 RX ADMIN — Medication 975 MILLIGRAM(S): at 18:10

## 2022-08-15 RX ADMIN — Medication 1000 MILLIGRAM(S): at 13:00

## 2022-08-15 RX ADMIN — SIMETHICONE 80 MILLIGRAM(S): 80 TABLET, CHEWABLE ORAL at 23:44

## 2022-08-15 RX ADMIN — Medication 600 MILLIGRAM(S): at 15:41

## 2022-08-15 RX ADMIN — HEPARIN SODIUM 5000 UNIT(S): 5000 INJECTION INTRAVENOUS; SUBCUTANEOUS at 18:11

## 2022-08-15 RX ADMIN — Medication 975 MILLIGRAM(S): at 23:44

## 2022-08-15 RX ADMIN — HEPARIN SODIUM 5000 UNIT(S): 5000 INJECTION INTRAVENOUS; SUBCUTANEOUS at 06:05

## 2022-08-15 NOTE — PROGRESS NOTE ADULT - SUBJECTIVE AND OBJECTIVE BOX
Day 1 of Anesthesia Pain Management Service    SUBJECTIVE: Doing ok  Pain Scale Score:          [X] Refer to charted pain scores    THERAPY:  s/p   2  mg PF epidural morphine on 8\14\2022    MEDICATIONS  (STANDING):  acetaminophen     Tablet  975 milliGRAM(s) Oral every 6 hours  acetaminophen   IVPB 1000 milliGRAM(s) IV Intermittent every 6 hours  diphtheria/tetanus/pertussis (acellular) Vaccine (ADAcel) 0.5 milliLiter(s) IntraMuscular once  heparin   Injectable 5000 Unit(s) SubCutaneous every 12 hours  ibuprofen  Tablet. 600 milliGRAM(s) Oral every 6 hours  lactated ringers. 1000 milliLiter(s) (50 mL/Hr) IV Continuous <Continuous>  morphine PF Epidural 2 milliGRAM(s) Epidural once  oxytocin Infusion 333.333 milliUNIT(s)/Min (1000 mL/Hr) IV Continuous <Continuous>  oxytocin Infusion. 4 milliUNIT(s)/Min (4 mL/Hr) IV Continuous <Continuous>  terbutaline  Injectable 0.25 milliGRAM(s) SubCutaneous once    MEDICATIONS  (PRN):  dexAMETHasone  Injectable 4 milliGRAM(s) IV Push every 6 hours PRN Nausea  diphenhydrAMINE 25 milliGRAM(s) Oral every 6 hours PRN Pruritus  lanolin Ointment 1 Application(s) Topical every 6 hours PRN Sore Nipples  magnesium hydroxide Suspension 30 milliLiter(s) Oral two times a day PRN Constipation  naloxone Injectable 0.1 milliGRAM(s) IV Push every 3 minutes PRN For ANY of the following changes in patient status:  A. Breaths Per Minute LESS THAN 10, B. Oxygen saturation LESS THAN 90%, C. Sedation score of 6 for Stop After: 4 Times  ondansetron Injectable 4 milliGRAM(s) IV Push every 6 hours PRN Nausea  oxyCODONE    IR 5 milliGRAM(s) Oral every 3 hours PRN Moderate to Severe Pain (4-10)  oxyCODONE    IR 5 milliGRAM(s) Oral once PRN Moderate to Severe Pain (4-10)  simethicone 80 milliGRAM(s) Chew every 4 hours PRN Gas      OBJECTIVE:    Sedation:        	[X] Alert	           [ ] Drowsy	[ ] Arousable      [ ] Asleep       [ ] Unresponsive    Side Effects:	[X] None 	[ ] Nausea	[ ] Vomiting         [ ] Pruritus  		[ ] Weakness            [ ] Numbness	          [ ] Other:    Vital Signs Last 24 Hrs  T(C): 36.4 (15 Aug 2022 08:00), Max: 36.8 (14 Aug 2022 19:15)  T(F): 97.5 (15 Aug 2022 08:00), Max: 98.2 (14 Aug 2022 19:15)  HR: 87 (15 Aug 2022 08:00) (67 - 124)  BP: 125/84 (15 Aug 2022 08:00) (104/85 - 154/71)  BP(mean): 91 (14 Aug 2022 19:15) (85 - 100)  RR: 18 (15 Aug 2022 08:00) (16 - 18)  SpO2: 97% (15 Aug 2022 08:00) (79% - 100%)    Parameters below as of 15 Aug 2022 08:00  Patient On (Oxygen Delivery Method): room air      ASSESSMENT/ PLAN  [X] Patient to be transitioned to prn analgesics after 24 hours   [X] Pain management per primary service, pain service to sign off   [X]Documentation and Verification of current medications

## 2022-08-15 NOTE — PROGRESS NOTE ADULT - SUBJECTIVE AND OBJECTIVE BOX
OB Progress Note    S: Patient seen and examined at bedside. Pain well controlled, tolerating regular diet, passing flatus, ambulating without difficulty, voiding spontaneously. Denies heavy vaginal bleeding, N/V, CP/SOB/lightheadedness/dizziness, headache, visual disturbances, epigastric pain.     O:   Vital Signs Last 24 Hrs  T(C): 36.3 (15 Aug 2022 02:03), Max: 36.8 (14 Aug 2022 19:15)  T(F): 97.4 (15 Aug 2022 02:03), Max: 98.2 (14 Aug 2022 19:15)  HR: 75 (15 Aug 2022 04:04) (63 - 144)  BP: 111/74 (15 Aug 2022 04:04) (104/85 - 166/92)  BP(mean): 91 (14 Aug 2022 19:15) (85 - 100)  RR: 18 (15 Aug 2022 04:04) (16 - 18)  SpO2: 98% (15 Aug 2022 04:04) (79% - 100%)    Parameters below as of 15 Aug 2022 04:04  Patient On (Oxygen Delivery Method): room air        Labs:  Blood type: AB Positive  Rubella IgG: RPR: Negative                          9.7<L>   12.22<H> >-----------< 89<L>    ( 08-15 @ 04:08 )             28.3<L>                        10.9<L>   13.48<H> >-----------< 87<L>    ( 08-14 @ 18:09 )             33.2<L>                        11.8   8.07 >-----------< 87<L>    ( 08-14 @ 13:56 )             35.3                        11.7   7.35 >-----------< 102<L>    ( 08-14 @ 07:26 )             34.9    08-14-22 @ 21:59      138  |  104  |  9   ----------------------------<  151<H>  3.9   |  20<L>  |  0.82    08-14-22 @ 13:56      139  |  104  |  12  ----------------------------<  130<H>  3.2<L>   |  17<L>  |  0.92    08-14-22 @ 07:25      138  |  105  |  13  ----------------------------<  81  3.6   |  18<L>  |  0.79        Ca    8.5      14 Aug 2022 21:59  Ca    8.8      14 Aug 2022 13:56  Ca    9.1      14 Aug 2022 07:25  Mg     4.8<H>     08-14    TPro  5.9<L>  /  Alb  3.3  /  TBili  0.5  /  DBili  x   /  AST  36  /  ALT  13  /  AlkPhos  214<H>  08-14-22 @ 21:59  TPro  6.3  /  Alb  3.6  /  TBili  0.5  /  DBili  x   /  AST  26  /  ALT  13  /  AlkPhos  253<H>  08-14-22 @ 13:56  TPro  7.0  /  Alb  3.9  /  TBili  0.5  /  DBili  x   /  AST  26  /  ALT  15  /  AlkPhos  256<H>  08-14-22 @ 07:25          PE:  General: NAD  Abdomen: soft, nontender, nondistended, fundus firm  Incision: Pfannensteil incision c/d/i.  : No heavy vaginal bleeding  Extremities: No erythema, no pitting edema     OB Progress Note    S: Patient seen and examined at bedside. Pain well controlled, tolerating regular diet, passing flatus. Sitting up in bed. Brooke in situ. Denies heavy vaginal bleeding, N/V, CP/SOB/lightheadedness/dizziness, headache, visual disturbances, epigastric pain.     O:   Vital Signs Last 24 Hrs  T(C): 36.3 (15 Aug 2022 02:03), Max: 36.8 (14 Aug 2022 19:15)  T(F): 97.4 (15 Aug 2022 02:03), Max: 98.2 (14 Aug 2022 19:15)  HR: 75 (15 Aug 2022 04:04) (63 - 144)  BP: 111/74 (15 Aug 2022 04:04) (104/85 - 166/92)  BP(mean): 91 (14 Aug 2022 19:15) (85 - 100)  RR: 18 (15 Aug 2022 04:04) (16 - 18)  SpO2: 98% (15 Aug 2022 04:04) (79% - 100%)    Parameters below as of 15 Aug 2022 04:04  Patient On (Oxygen Delivery Method): room air        Labs:  Blood type: AB Positive  Rubella IgG: RPR: Negative                          9.7<L>   12.22<H> >-----------< 89<L>    ( 08-15 @ 04:08 )             28.3<L>                        10.9<L>   13.48<H> >-----------< 87<L>    ( 08-14 @ 18:09 )             33.2<L>                        11.8   8.07 >-----------< 87<L>    ( 08-14 @ 13:56 )             35.3                        11.7   7.35 >-----------< 102<L>    ( 08-14 @ 07:26 )             34.9    08-14-22 @ 21:59      138  |  104  |  9   ----------------------------<  151<H>  3.9   |  20<L>  |  0.82    08-14-22 @ 13:56      139  |  104  |  12  ----------------------------<  130<H>  3.2<L>   |  17<L>  |  0.92    08-14-22 @ 07:25      138  |  105  |  13  ----------------------------<  81  3.6   |  18<L>  |  0.79        Ca    8.5      14 Aug 2022 21:59  Ca    8.8      14 Aug 2022 13:56  Ca    9.1      14 Aug 2022 07:25  Mg     4.8<H>     08-14    TPro  5.9<L>  /  Alb  3.3  /  TBili  0.5  /  DBili  x   /  AST  36  /  ALT  13  /  AlkPhos  214<H>  08-14-22 @ 21:59  TPro  6.3  /  Alb  3.6  /  TBili  0.5  /  DBili  x   /  AST  26  /  ALT  13  /  AlkPhos  253<H>  08-14-22 @ 13:56  TPro  7.0  /  Alb  3.9  /  TBili  0.5  /  DBili  x   /  AST  26  /  ALT  15  /  AlkPhos  256<H>  08-14-22 @ 07:25          PE:  General: NAD  Abdomen: soft, nontender, nondistended, fundus firm  Incision: Pfannensteil incision c/d/i.  : No heavy vaginal bleeding  Extremities: No erythema, no pitting edema

## 2022-08-15 NOTE — PROVIDER CONTACT NOTE (OTHER) - ACTION/TREATMENT ORDERED:
Dr. Padilla ordered to stop magnesium drip for 2 hours; restart at 1 gm/hr. LJ  Reassurance provided. Pt remains safe.

## 2022-08-15 NOTE — PROGRESS NOTE ADULT - ASSESSMENT
A/P: 31yo POD#1 s/p pLTCS for cat 2 (ebl 754) c/b sPEC (thrombocytopenia).  Patient is stable and doing well post-operatively.      #sPEC  - Mg for seizure PPx (8/14- )  - monitor BPs  - f/u AM HELLP labs     #Postpartum  - HSQ and SCDs for DVT PPx  - Regular diet  - OOB, encourage ambulation  - Continue motrin, tylenol, oxycodone PRN for pain control.      Melania Kelly, PGY-3   A/P: 33yo POD#1 s/p pLTCS for cat 2 (ebl 754) c/b sPEC (thrombocytopenia).  Patient is stable and doing well post-operatively.      #sPEC  - Mg for seizure PPx (8/14- )  - d/c Brooke after magnesium   - monitor BPs  - f/u AM HELLP labs     #Postpartum  - HSQ and SCDs for DVT PPx  - Regular diet  - OOB, encourage ambulation  - Continue motrin, tylenol, oxycodone PRN for pain control.      Melania Kelly, PGY-3

## 2022-08-16 LAB
ALBUMIN SERPL ELPH-MCNC: 2.8 G/DL — LOW (ref 3.3–5)
ALP SERPL-CCNC: 171 U/L — HIGH (ref 40–120)
ALT FLD-CCNC: 11 U/L — SIGNIFICANT CHANGE UP (ref 10–45)
ANION GAP SERPL CALC-SCNC: 11 MMOL/L — SIGNIFICANT CHANGE UP (ref 5–17)
APTT BLD: 28.6 SEC — SIGNIFICANT CHANGE UP (ref 27.5–35.5)
AST SERPL-CCNC: 29 U/L — SIGNIFICANT CHANGE UP (ref 10–40)
BASOPHILS # BLD AUTO: 0.03 K/UL — SIGNIFICANT CHANGE UP (ref 0–0.2)
BASOPHILS NFR BLD AUTO: 0.3 % — SIGNIFICANT CHANGE UP (ref 0–2)
BILIRUB SERPL-MCNC: 0.3 MG/DL — SIGNIFICANT CHANGE UP (ref 0.2–1.2)
BUN SERPL-MCNC: 14 MG/DL — SIGNIFICANT CHANGE UP (ref 7–23)
CALCIUM SERPL-MCNC: 8.1 MG/DL — LOW (ref 8.4–10.5)
CHLORIDE SERPL-SCNC: 102 MMOL/L — SIGNIFICANT CHANGE UP (ref 96–108)
CO2 SERPL-SCNC: 24 MMOL/L — SIGNIFICANT CHANGE UP (ref 22–31)
CREAT SERPL-MCNC: 0.87 MG/DL — SIGNIFICANT CHANGE UP (ref 0.5–1.3)
EGFR: 90 ML/MIN/1.73M2 — SIGNIFICANT CHANGE UP
EOSINOPHIL # BLD AUTO: 0.08 K/UL — SIGNIFICANT CHANGE UP (ref 0–0.5)
EOSINOPHIL NFR BLD AUTO: 0.9 % — SIGNIFICANT CHANGE UP (ref 0–6)
FIBRINOGEN PPP-MCNC: 884 MG/DL — HIGH (ref 330–520)
GLUCOSE SERPL-MCNC: 78 MG/DL — SIGNIFICANT CHANGE UP (ref 70–99)
HCT VFR BLD CALC: 27.8 % — LOW (ref 34.5–45)
HGB BLD-MCNC: 9.1 G/DL — LOW (ref 11.5–15.5)
IMM GRANULOCYTES NFR BLD AUTO: 0.5 % — SIGNIFICANT CHANGE UP (ref 0–1.5)
INR BLD: 0.94 RATIO — SIGNIFICANT CHANGE UP (ref 0.88–1.16)
LDH SERPL L TO P-CCNC: 374 U/L — HIGH (ref 50–242)
LYMPHOCYTES # BLD AUTO: 1.63 K/UL — SIGNIFICANT CHANGE UP (ref 1–3.3)
LYMPHOCYTES # BLD AUTO: 17.7 % — SIGNIFICANT CHANGE UP (ref 13–44)
MCHC RBC-ENTMCNC: 30.1 PG — SIGNIFICANT CHANGE UP (ref 27–34)
MCHC RBC-ENTMCNC: 32.7 GM/DL — SIGNIFICANT CHANGE UP (ref 32–36)
MCV RBC AUTO: 92.1 FL — SIGNIFICANT CHANGE UP (ref 80–100)
MONOCYTES # BLD AUTO: 0.49 K/UL — SIGNIFICANT CHANGE UP (ref 0–0.9)
MONOCYTES NFR BLD AUTO: 5.3 % — SIGNIFICANT CHANGE UP (ref 2–14)
NEUTROPHILS # BLD AUTO: 6.92 K/UL — SIGNIFICANT CHANGE UP (ref 1.8–7.4)
NEUTROPHILS NFR BLD AUTO: 75.3 % — SIGNIFICANT CHANGE UP (ref 43–77)
NRBC # BLD: 0 /100 WBCS — SIGNIFICANT CHANGE UP (ref 0–0)
PLATELET # BLD AUTO: 97 K/UL — LOW (ref 150–400)
POTASSIUM SERPL-MCNC: 4.1 MMOL/L — SIGNIFICANT CHANGE UP (ref 3.5–5.3)
POTASSIUM SERPL-SCNC: 4.1 MMOL/L — SIGNIFICANT CHANGE UP (ref 3.5–5.3)
PROT SERPL-MCNC: 5.7 G/DL — LOW (ref 6–8.3)
PROTHROM AB SERPL-ACNC: 10.8 SEC — SIGNIFICANT CHANGE UP (ref 10.5–13.4)
RBC # BLD: 3.02 M/UL — LOW (ref 3.8–5.2)
RBC # FLD: 13.7 % — SIGNIFICANT CHANGE UP (ref 10.3–14.5)
SODIUM SERPL-SCNC: 137 MMOL/L — SIGNIFICANT CHANGE UP (ref 135–145)
URATE SERPL-MCNC: 7.3 MG/DL — HIGH (ref 2.5–7)
WBC # BLD: 9.2 K/UL — SIGNIFICANT CHANGE UP (ref 3.8–10.5)
WBC # FLD AUTO: 9.2 K/UL — SIGNIFICANT CHANGE UP (ref 3.8–10.5)

## 2022-08-16 RX ADMIN — Medication 975 MILLIGRAM(S): at 00:24

## 2022-08-16 RX ADMIN — Medication 600 MILLIGRAM(S): at 10:45

## 2022-08-16 RX ADMIN — Medication 600 MILLIGRAM(S): at 03:50

## 2022-08-16 RX ADMIN — HEPARIN SODIUM 5000 UNIT(S): 5000 INJECTION INTRAVENOUS; SUBCUTANEOUS at 05:31

## 2022-08-16 RX ADMIN — Medication 600 MILLIGRAM(S): at 04:40

## 2022-08-16 RX ADMIN — Medication 975 MILLIGRAM(S): at 05:21

## 2022-08-16 RX ADMIN — Medication 600 MILLIGRAM(S): at 21:49

## 2022-08-16 RX ADMIN — Medication 975 MILLIGRAM(S): at 12:06

## 2022-08-16 RX ADMIN — Medication 600 MILLIGRAM(S): at 22:30

## 2022-08-16 RX ADMIN — Medication 975 MILLIGRAM(S): at 18:32

## 2022-08-16 RX ADMIN — Medication 600 MILLIGRAM(S): at 10:00

## 2022-08-16 RX ADMIN — Medication 975 MILLIGRAM(S): at 13:00

## 2022-08-16 RX ADMIN — HEPARIN SODIUM 5000 UNIT(S): 5000 INJECTION INTRAVENOUS; SUBCUTANEOUS at 18:00

## 2022-08-16 RX ADMIN — Medication 975 MILLIGRAM(S): at 18:01

## 2022-08-16 NOTE — PROGRESS NOTE ADULT - ASSESSMENT
A/P: 33yo POD#2 s/p pLTCS for cat 2 (ebl 754) c/b sPEC (thrombocytopenia).  Patient is stable and doing well post-operatively.      #sPEC  - s/p Mg for seizure PPx (8/14-8/15)  - thrombocytopenia stable: 102->87->87->89  - monitor BPs  - f/u AM HELLP labs     #Postpartum  - HSQ and SCDs for DVT PPx  - Regular diet  - OOB, encourage ambulation  - Continue motrin, tylenol, oxycodone PRN for pain control.      Melania Kelly, PGY-3

## 2022-08-16 NOTE — PROGRESS NOTE ADULT - SUBJECTIVE AND OBJECTIVE BOX
OB Progress Note    S: Patient seen and examined at bedside. Pain well controlled, tolerating regular diet, passing flatus, ambulating without difficulty, voiding spontaneously. Denies heavy vaginal bleeding, N/V, CP/SOB/lightheadedness/dizziness, headache, visual disturbances, epigastric pain.     O:   Vital Signs Last 24 Hrs  T(C): 36.5 (16 Aug 2022 00:33), Max: 37.1 (15 Aug 2022 17:22)  T(F): 97.7 (16 Aug 2022 00:33), Max: 98.7 (15 Aug 2022 17:22)  HR: 66 (16 Aug 2022 00:33) (66 - 90)  BP: 137/87 (16 Aug 2022 00:33) (116/76 - 137/87)  BP(mean): --  RR: 18 (16 Aug 2022 00:33) (16 - 18)  SpO2: 97% (16 Aug 2022 00:33) (97% - 100%)    Parameters below as of 16 Aug 2022 00:33  Patient On (Oxygen Delivery Method): room air        Labs:  Blood type: AB Positive  Rubella IgG: RPR: Negative                          9.7<L>   12.22<H> >-----------< 89<L>    ( 08-15 @ 04:08 )             28.3<L>                        10.9<L>   13.48<H> >-----------< 87<L>    ( 08-14 @ 18:09 )             33.2<L>                        11.8   8.07 >-----------< 87<L>    ( 08-14 @ 13:56 )             35.3                        11.7   7.35 >-----------< 102<L>    ( 08-14 @ 07:26 )             34.9    08-15-22 @ 04:08      136  |  102  |  8   ----------------------------<  109<H>  3.9   |  22  |  0.78    08-14-22 @ 21:59      138  |  104  |  9   ----------------------------<  151<H>  3.9   |  20<L>  |  0.82    08-14-22 @ 13:56      139  |  104  |  12  ----------------------------<  130<H>  3.2<L>   |  17<L>  |  0.92    08-14-22 @ 07:25      138  |  105  |  13  ----------------------------<  81  3.6   |  18<L>  |  0.79        Ca    7.9<L>      15 Aug 2022 04:08  Ca    8.5      14 Aug 2022 21:59  Ca    8.8      14 Aug 2022 13:56  Ca    9.1      14 Aug 2022 07:25  Mg     7.4<H>     08-15  Mg     6.7<H>     08-15  Mg     4.8<H>     08-14    TPro  5.2<L>  /  Alb  2.7<L>  /  TBili  0.6  /  DBili  x   /  AST  33  /  ALT  11  /  AlkPhos  184<H>  08-15-22 @ 04:08  TPro  5.9<L>  /  Alb  3.3  /  TBili  0.5  /  DBili  x   /  AST  36  /  ALT  13  /  AlkPhos  214<H>  08-14-22 @ 21:59  TPro  6.3  /  Alb  3.6  /  TBili  0.5  /  DBili  x   /  AST  26  /  ALT  13  /  AlkPhos  253<H>  08-14-22 @ 13:56  TPro  7.0  /  Alb  3.9  /  TBili  0.5  /  DBili  x   /  AST  26  /  ALT  15  /  AlkPhos  256<H>  08-14-22 @ 07:25          PE:  General: NAD  Abdomen: soft, nontender, nondistended, fundus firm  Incision: Pfannensteil incision c/d/i.  : No heavy vaginal bleeding  Extremities: No erythema, no pitting edema

## 2022-08-17 RX ORDER — NIFEDIPINE 30 MG
30 TABLET, EXTENDED RELEASE 24 HR ORAL DAILY
Refills: 0 | Status: DISCONTINUED | OUTPATIENT
Start: 2022-08-17 | End: 2022-08-18

## 2022-08-17 RX ORDER — OXYCODONE HYDROCHLORIDE 5 MG/1
1 TABLET ORAL
Qty: 4 | Refills: 0
Start: 2022-08-17 | End: 2022-08-17

## 2022-08-17 RX ADMIN — Medication 600 MILLIGRAM(S): at 20:57

## 2022-08-17 RX ADMIN — Medication 975 MILLIGRAM(S): at 18:09

## 2022-08-17 RX ADMIN — HEPARIN SODIUM 5000 UNIT(S): 5000 INJECTION INTRAVENOUS; SUBCUTANEOUS at 06:13

## 2022-08-17 RX ADMIN — Medication 975 MILLIGRAM(S): at 11:58

## 2022-08-17 RX ADMIN — Medication 975 MILLIGRAM(S): at 06:40

## 2022-08-17 RX ADMIN — Medication 600 MILLIGRAM(S): at 15:20

## 2022-08-17 RX ADMIN — HEPARIN SODIUM 5000 UNIT(S): 5000 INJECTION INTRAVENOUS; SUBCUTANEOUS at 18:09

## 2022-08-17 RX ADMIN — Medication 600 MILLIGRAM(S): at 16:00

## 2022-08-17 RX ADMIN — Medication 975 MILLIGRAM(S): at 23:38

## 2022-08-17 RX ADMIN — Medication 975 MILLIGRAM(S): at 00:26

## 2022-08-17 RX ADMIN — Medication 975 MILLIGRAM(S): at 06:13

## 2022-08-17 RX ADMIN — Medication 600 MILLIGRAM(S): at 22:00

## 2022-08-17 RX ADMIN — Medication 600 MILLIGRAM(S): at 10:00

## 2022-08-17 RX ADMIN — Medication 975 MILLIGRAM(S): at 01:00

## 2022-08-17 RX ADMIN — Medication 30 MILLIGRAM(S): at 11:59

## 2022-08-17 RX ADMIN — Medication 600 MILLIGRAM(S): at 09:13

## 2022-08-17 RX ADMIN — Medication 975 MILLIGRAM(S): at 19:01

## 2022-08-17 NOTE — CHART NOTE - NSCHARTNOTEFT_GEN_A_CORE
OB attending     Patient with mild bps this AM, recommend start procardia 30xL to control bps.  Recommend stay overnight for observation on meds.  Patient agreeable with this plan. All questions answered.     Vital Signs Last 24 Hrs  T(C): 36.8 (17 Aug 2022 09:50), Max: 37.1 (16 Aug 2022 14:36)  T(F): 98.2 (17 Aug 2022 09:50), Max: 98.7 (16 Aug 2022 14:36)  HR: 60 (17 Aug 2022 09:50) (60 - 79)  BP: 145/86 (17 Aug 2022 09:50) (127/80 - 147/95)  BP(mean): --  RR: 17 (17 Aug 2022 09:50) (17 - 18)  SpO2: 97% (17 Aug 2022 09:50) (97% - 99%)    Parameters below as of 17 Aug 2022 09:50  Patient On (Oxygen Delivery Method): room air        Gladys Gonzalez MD
Patient had repeat HELLP labs drawn, demonstrate downtrending platelets 102->87. Remainder of HELLP labs wnl, BPs mild range. Given lab abnormalities w/ mild range BPs, patient meets criteria for sPEC. Patient denies symptoms of headache, vision changes, chest pain, SOB, upper abdominal pain or leg swelling.     Vital Signs Last 24 Hrs  T(C): 36.6 (14 Aug 2022 13:44), Max: 36.7 (14 Aug 2022 06:20)  T(F): 97.88 (14 Aug 2022 13:44), Max: 98.1 (14 Aug 2022 06:20)  HR: 93 (14 Aug 2022 15:18) (63 - 144)  BP: 145/72 (14 Aug 2022 15:09) (105/59 - 166/92)  BP(mean): --  RR: 16 (14 Aug 2022 07:51) (16 - 16)  SpO2: 98% (14 Aug 2022 15:18) (87% - 100%)    Parameters below as of 14 Aug 2022 06:58  Patient On (Oxygen Delivery Method): room air    Plan  - Start Mg, continue Mg until 24hr postpartum  - Continue to monitor BPs  - HELLP labs q6hr   - Will defer standing antihypertensives at this time  - Patient agreeable w/ plan, all questions answered     d/w and evaluated w/ Dr. Rosanna Chahal PGY3

## 2022-08-17 NOTE — PROGRESS NOTE ADULT - SUBJECTIVE AND OBJECTIVE BOX
OB Progress Note    S: Patient seen and examined at bedside. Pain well controlled, tolerating regular diet, passing flatus, ambulating without difficulty, voiding spontaneously. Denies heavy vaginal bleeding, N/V, CP/SOB/lightheadedness/dizziness, headache, visual disturbances, epigastric pain.     O:   Vital Signs Last 24 Hrs  T(C): 36.7 (17 Aug 2022 00:13), Max: 37.1 (16 Aug 2022 14:36)  T(F): 98.1 (17 Aug 2022 00:13), Max: 98.7 (16 Aug 2022 14:36)  HR: 70 (17 Aug 2022 00:13) (70 - 79)  BP: 145/86 (17 Aug 2022 00:13) (123/78 - 147/95)  BP(mean): --  RR: 18 (17 Aug 2022 00:13) (18 - 18)  SpO2: 99% (17 Aug 2022 00:13) (97% - 99%)    Parameters below as of 17 Aug 2022 00:13  Patient On (Oxygen Delivery Method): room air        Labs:  Blood type: AB Positive  Rubella IgG: RPR: Negative                          9.1<L>   9.20 >-----------< 97<L>    ( 08-16 @ 07:17 )             27.8<L>                        9.7<L>   12.22<H> >-----------< 89<L>    ( 08-15 @ 04:08 )             28.3<L>                        10.9<L>   13.48<H> >-----------< 87<L>    ( 08-14 @ 18:09 )             33.2<L>                        11.8   8.07 >-----------< 87<L>    ( 08-14 @ 13:56 )             35.3                        11.7   7.35 >-----------< 102<L>    ( 08-14 @ 07:26 )             34.9    08-16-22 @ 07:23      137  |  102  |  14  ----------------------------<  78  4.1   |  24  |  0.87    08-15-22 @ 04:08      136  |  102  |  8   ----------------------------<  109<H>  3.9   |  22  |  0.78    08-14-22 @ 21:59      138  |  104  |  9   ----------------------------<  151<H>  3.9   |  20<L>  |  0.82    08-14-22 @ 13:56      139  |  104  |  12  ----------------------------<  130<H>  3.2<L>   |  17<L>  |  0.92    08-14-22 @ 07:25      138  |  105  |  13  ----------------------------<  81  3.6   |  18<L>  |  0.79        Ca    8.1<L>      16 Aug 2022 07:23  Ca    7.9<L>      15 Aug 2022 04:08  Ca    8.5      14 Aug 2022 21:59  Ca    8.8      14 Aug 2022 13:56  Ca    9.1      14 Aug 2022 07:25  Mg     7.4<H>     08-15  Mg     6.7<H>     08-15  Mg     4.8<H>     08-14    TPro  5.7<L>  /  Alb  2.8<L>  /  TBili  0.3  /  DBili  x   /  AST  29  /  ALT  11  /  AlkPhos  171<H>  08-16-22 @ 07:23  TPro  5.2<L>  /  Alb  2.7<L>  /  TBili  0.6  /  DBili  x   /  AST  33  /  ALT  11  /  AlkPhos  184<H>  08-15-22 @ 04:08  TPro  5.9<L>  /  Alb  3.3  /  TBili  0.5  /  DBili  x   /  AST  36  /  ALT  13  /  AlkPhos  214<H>  08-14-22 @ 21:59  TPro  6.3  /  Alb  3.6  /  TBili  0.5  /  DBili  x   /  AST  26  /  ALT  13  /  AlkPhos  253<H>  08-14-22 @ 13:56  TPro  7.0  /  Alb  3.9  /  TBili  0.5  /  DBili  x   /  AST  26  /  ALT  15  /  AlkPhos  256<H>  08-14-22 @ 07:25          PE:  General: NAD  Abdomen: soft, nontender, nondistended, fundus firm  Incision: Pfannensteil incision c/d/i.  : No heavy vaginal bleeding  Extremities: No erythema, no pitting edema   OB Progress Note    S: Patient seen and examined at bedside. Pain well controlled and patient without acute concerns.     O:   Vital Signs Last 24 Hrs  T(C): 36.7 (17 Aug 2022 00:13), Max: 37.1 (16 Aug 2022 14:36)  T(F): 98.1 (17 Aug 2022 00:13), Max: 98.7 (16 Aug 2022 14:36)  HR: 70 (17 Aug 2022 00:13) (70 - 79)  BP: 145/86 (17 Aug 2022 00:13) (123/78 - 147/95)  BP(mean): --  RR: 18 (17 Aug 2022 00:13) (18 - 18)  SpO2: 99% (17 Aug 2022 00:13) (97% - 99%)    Parameters below as of 17 Aug 2022 00:13  Patient On (Oxygen Delivery Method): room air        Labs:  Blood type: AB Positive  Rubella IgG: RPR: Negative                          9.1<L>   9.20 >-----------< 97<L>    ( 08-16 @ 07:17 )             27.8<L>                        9.7<L>   12.22<H> >-----------< 89<L>    ( 08-15 @ 04:08 )             28.3<L>                        10.9<L>   13.48<H> >-----------< 87<L>    ( 08-14 @ 18:09 )             33.2<L>                        11.8   8.07 >-----------< 87<L>    ( 08-14 @ 13:56 )             35.3                        11.7   7.35 >-----------< 102<L>    ( 08-14 @ 07:26 )             34.9    08-16-22 @ 07:23      137  |  102  |  14  ----------------------------<  78  4.1   |  24  |  0.87    08-15-22 @ 04:08      136  |  102  |  8   ----------------------------<  109<H>  3.9   |  22  |  0.78    08-14-22 @ 21:59      138  |  104  |  9   ----------------------------<  151<H>  3.9   |  20<L>  |  0.82    08-14-22 @ 13:56      139  |  104  |  12  ----------------------------<  130<H>  3.2<L>   |  17<L>  |  0.92    08-14-22 @ 07:25      138  |  105  |  13  ----------------------------<  81  3.6   |  18<L>  |  0.79        Ca    8.1<L>      16 Aug 2022 07:23  Ca    7.9<L>      15 Aug 2022 04:08  Ca    8.5      14 Aug 2022 21:59  Ca    8.8      14 Aug 2022 13:56  Ca    9.1      14 Aug 2022 07:25  Mg     7.4<H>     08-15  Mg     6.7<H>     08-15  Mg     4.8<H>     08-14    TPro  5.7<L>  /  Alb  2.8<L>  /  TBili  0.3  /  DBili  x   /  AST  29  /  ALT  11  /  AlkPhos  171<H>  08-16-22 @ 07:23  TPro  5.2<L>  /  Alb  2.7<L>  /  TBili  0.6  /  DBili  x   /  AST  33  /  ALT  11  /  AlkPhos  184<H>  08-15-22 @ 04:08  TPro  5.9<L>  /  Alb  3.3  /  TBili  0.5  /  DBili  x   /  AST  36  /  ALT  13  /  AlkPhos  214<H>  08-14-22 @ 21:59  TPro  6.3  /  Alb  3.6  /  TBili  0.5  /  DBili  x   /  AST  26  /  ALT  13  /  AlkPhos  253<H>  08-14-22 @ 13:56  TPro  7.0  /  Alb  3.9  /  TBili  0.5  /  DBili  x   /  AST  26  /  ALT  15  /  AlkPhos  256<H>  08-14-22 @ 07:25          PE:  General: NAD  Abdomen: soft, nontender, nondistended, fundus firm  Incision: Pfannensteil incision c/d/i.  : No heavy vaginal bleeding  Extremities: No erythema, no pitting edema

## 2022-08-17 NOTE — PROGRESS NOTE ADULT - ASSESSMENT
A/P: 33yo POD#3 s/p pLTCS for cat 2 (ebl 754) c/b sPEC (thrombocytopenia).  Patient is stable and doing well post-operatively.      #sPEC  - s/p Mg for seizure PPx (8/14-8/15)  - thrombocytopenia stable: 102->87->87->89->97  - monitor BPs  - HELLP labs wnl    #Postpartum  - HSQ and SCDs for DVT PPx  - Regular diet  - OOB, encourage ambulation  - Continue motrin, tylenol, oxycodone PRN for pain control.      Melania Kelly, PGY-3

## 2022-08-18 VITALS
OXYGEN SATURATION: 99 % | SYSTOLIC BLOOD PRESSURE: 121 MMHG | RESPIRATION RATE: 18 BRPM | DIASTOLIC BLOOD PRESSURE: 80 MMHG | TEMPERATURE: 98 F | HEART RATE: 74 BPM

## 2022-08-18 PROCEDURE — 83735 ASSAY OF MAGNESIUM: CPT

## 2022-08-18 PROCEDURE — 86900 BLOOD TYPING SEROLOGIC ABO: CPT

## 2022-08-18 PROCEDURE — 86769 SARS-COV-2 COVID-19 ANTIBODY: CPT

## 2022-08-18 PROCEDURE — 85384 FIBRINOGEN ACTIVITY: CPT

## 2022-08-18 PROCEDURE — 88307 TISSUE EXAM BY PATHOLOGIST: CPT

## 2022-08-18 PROCEDURE — 59025 FETAL NON-STRESS TEST: CPT

## 2022-08-18 PROCEDURE — 86850 RBC ANTIBODY SCREEN: CPT

## 2022-08-18 PROCEDURE — 85610 PROTHROMBIN TIME: CPT

## 2022-08-18 PROCEDURE — 85730 THROMBOPLASTIN TIME PARTIAL: CPT

## 2022-08-18 PROCEDURE — 84156 ASSAY OF PROTEIN URINE: CPT

## 2022-08-18 PROCEDURE — 82570 ASSAY OF URINE CREATININE: CPT

## 2022-08-18 PROCEDURE — G0463: CPT

## 2022-08-18 PROCEDURE — U0003: CPT

## 2022-08-18 PROCEDURE — 81001 URINALYSIS AUTO W/SCOPE: CPT

## 2022-08-18 PROCEDURE — U0005: CPT

## 2022-08-18 PROCEDURE — 80053 COMPREHEN METABOLIC PANEL: CPT

## 2022-08-18 PROCEDURE — 59050 FETAL MONITOR W/REPORT: CPT

## 2022-08-18 PROCEDURE — 86780 TREPONEMA PALLIDUM: CPT

## 2022-08-18 PROCEDURE — 84550 ASSAY OF BLOOD/URIC ACID: CPT

## 2022-08-18 PROCEDURE — 36415 COLL VENOUS BLD VENIPUNCTURE: CPT

## 2022-08-18 PROCEDURE — 86901 BLOOD TYPING SEROLOGIC RH(D): CPT

## 2022-08-18 PROCEDURE — 83615 LACTATE (LD) (LDH) ENZYME: CPT

## 2022-08-18 PROCEDURE — 85025 COMPLETE CBC W/AUTO DIFF WBC: CPT

## 2022-08-18 RX ORDER — IBUPROFEN 200 MG
1 TABLET ORAL
Qty: 0 | Refills: 0 | DISCHARGE
Start: 2022-08-18

## 2022-08-18 RX ORDER — NIFEDIPINE 30 MG
1 TABLET, EXTENDED RELEASE 24 HR ORAL
Qty: 0 | Refills: 0 | DISCHARGE
Start: 2022-08-18

## 2022-08-18 RX ADMIN — Medication 600 MILLIGRAM(S): at 09:01

## 2022-08-18 RX ADMIN — Medication 30 MILLIGRAM(S): at 11:19

## 2022-08-18 RX ADMIN — Medication 975 MILLIGRAM(S): at 01:00

## 2022-08-18 RX ADMIN — Medication 975 MILLIGRAM(S): at 06:13

## 2022-08-18 RX ADMIN — Medication 975 MILLIGRAM(S): at 06:45

## 2022-08-18 RX ADMIN — HEPARIN SODIUM 5000 UNIT(S): 5000 INJECTION INTRAVENOUS; SUBCUTANEOUS at 06:14

## 2022-08-18 RX ADMIN — Medication 600 MILLIGRAM(S): at 09:30

## 2022-08-18 NOTE — PROGRESS NOTE ADULT - SUBJECTIVE AND OBJECTIVE BOX
OB Progress Note    S: Patient seen and examined at bedside. Pain well controlled, tolerating regular diet, passing flatus, ambulating without difficulty, voiding spontaneously. Denies heavy vaginal bleeding, N/V, CP/SOB/lightheadedness/dizziness, headache, visual disturbances, epigastric pain.     O:   Vital Signs Last 24 Hrs  T(C): 36.5 (18 Aug 2022 00:14), Max: 36.9 (17 Aug 2022 17:05)  T(F): 97.7 (18 Aug 2022 00:14), Max: 98.4 (17 Aug 2022 17:05)  HR: 69 (18 Aug 2022 00:14) (60 - 72)  BP: 129/85 (18 Aug 2022 00:14) (126/79 - 145/86)  BP(mean): --  RR: 18 (18 Aug 2022 00:14) (17 - 18)  SpO2: 97% (18 Aug 2022 00:14) (97% - 100%)    Parameters below as of 17 Aug 2022 21:16  Patient On (Oxygen Delivery Method): room air        Labs:  Blood type: AB Positive  Rubella IgG: RPR: Negative                          9.1<L>   9.20 >-----------< 97<L>    ( 08-16 @ 07:17 )             27.8<L>                        9.7<L>   12.22<H> >-----------< 89<L>    ( 08-15 @ 04:08 )             28.3<L>    08-16-22 @ 07:23      137  |  102  |  14  ----------------------------<  78  4.1   |  24  |  0.87    08-15-22 @ 04:08      136  |  102  |  8   ----------------------------<  109<H>  3.9   |  22  |  0.78        Ca    8.1<L>      16 Aug 2022 07:23  Ca    7.9<L>      15 Aug 2022 04:08  Mg     7.4<H>     08-15  Mg     6.7<H>     08-15    TPro  5.7<L>  /  Alb  2.8<L>  /  TBili  0.3  /  DBili  x   /  AST  29  /  ALT  11  /  AlkPhos  171<H>  08-16-22 @ 07:23  TPro  5.2<L>  /  Alb  2.7<L>  /  TBili  0.6  /  DBili  x   /  AST  33  /  ALT  11  /  AlkPhos  184<H>  08-15-22 @ 04:08          PE:  General: NAD  Abdomen: soft, nontender, nondistended, fundus firm  Incision: Pfannensteil incision c/d/i.  : No heavy vaginal bleeding  Extremities: No erythema, no pitting edema

## 2022-08-18 NOTE — PROGRESS NOTE ADULT - ASSESSMENT
A/P: 33yo POD#4 s/p pLTCS for cat 2 (ebl 754) c/b sPEC (thrombocytopenia).  Patient is stable and doing well post-operatively.      #sPEC  - s/p Mg for seizure PPx (8/14-8/15)  - thrombocytopenia stable: 102->87->87->89->97  - started on Procardia 30XL with improvement in pressures  - monitor BPs    #Postpartum  - HSQ and SCDs for DVT PPx  - Regular diet  - OOB, encourage ambulation  - Continue motrin, tylenol, oxycodone PRN for pain control.      Melania Kelly, PGY-3

## 2022-08-18 NOTE — PROGRESS NOTE ADULT - ATTENDING COMMENTS
PT WANTS TO STAY UNTIL TOMORROW.  VSS  INCISION C/D/I  ROUTINE PP CARE.    J CHELSIEARO
Resident note reviewed , agree with above  VSS AF  fundus firm U-2  inc: C/D/I  ext: no edema  A/P : POD 4 -preclampsia with severe features-started on po procardia 30 -BP stable  -d/c today  Wade Marte MD
Patient is POD #3 s/p CD for nonreassuring FHT with severe pre-eclampsia s/p magnesium sulfate therapy. Patient reports feeling well with adequate pain relief, but has mild shoulder pain. Denies chest pain, shortness of breath, nausea or vomiting, epigastric pain or headaches. Tolerating diet, ambulating, voiding spontaneously and passing flatus.   Vitals T 98.1 145/86 (BP - 120-140s/70-80s)   P 70 RR 18   Gen: no acute distress   Abd: soft, nontender, fundus beneath umbilicus   Incision: clean/dry/intact ++ steristrips   Perineum: minimal lochia rubra   Ext: no calf tenderness   DTR 2+ upper and lower extremities   Labs and meds reviewed   A/P: POD #3 s/p CD with severe pre-eclampsia s/p magnesium sulfate therapy    -continue PRN pain control   -continue to monitor vitals    -encourage ambulation and incentive spirometry use   -plan for d/c home today   -RV in one week for BP check   -return precautions reviewed     DAVID Pérez
Patient seen and examined. doing well. tolerating Mag, but does feel foggy on it. tolerating po, umanzor in place. denies HA, vision changes, CP, SOB, ruq/epigastric pain.       VSS    abd: soft, NT, minimally distended, no rebound or guarding, fundus firm below umbilicus  incision: c/d/i, dermabond in place  ext: NT, symmetrical     A/P: POD1 doing well. tolerating Mag  - rhpositive  - s/p tdap  - MMR/VI  - boy for circ, reviewed risk including, bleeding, infection, injury to penis, risk/need for revision. pt agreeable to proceed with elective circumcision.    *) sPEC by thrombocytopenia  - plt nya at 87 8/14, today 89,   - mag x 24hrs pp  - normal bps, no meds  - pm set of help labs to continue to trend plt

## 2022-08-22 PROBLEM — Z00.00 ENCOUNTER FOR PREVENTIVE HEALTH EXAMINATION: Status: ACTIVE | Noted: 2022-08-22

## 2022-09-08 NOTE — HISTORY OF PRESENT ILLNESS
[Home] : at home, [unfilled] , at the time of the visit. [Other Location: e.g. Home (Enter Location, City,State)___] : at [unfilled] [Verbal consent obtained from patient] : the patient, [unfilled] [FreeTextEntry1] : Ms. TOVA BAIRD 32 year old F is here Sep 15th, 2022 to establish care in the [facility].\par

## 2022-09-15 ENCOUNTER — APPOINTMENT (OUTPATIENT)
Dept: CARDIOLOGY | Facility: CLINIC | Age: 32
End: 2022-09-15

## 2022-09-18 ENCOUNTER — RESULT REVIEW (OUTPATIENT)
Age: 32
End: 2022-09-18

## 2022-09-19 ENCOUNTER — NON-APPOINTMENT (OUTPATIENT)
Age: 32
End: 2022-09-19

## 2022-11-19 ENCOUNTER — NON-APPOINTMENT (OUTPATIENT)
Age: 32
End: 2022-11-19

## 2023-10-30 NOTE — OB RN DELIVERY SUMMARY - NSDELIVERYTYPEA_OBGYN_ALL_OB
No care due was identified.  Health system Embedded Care Due Messages. Reference number: [id number].   10/27/2023 1:17:22 PM CDT  
Please see the attached refill request.  
Refill Routing Note   Medication(s) are not appropriate for processing by [facility] for the following reason(s):      Medication outside of protocol    [facility] action(s):  Approve  Route Care Due:  None identified            Appointments  past 12m or future 3m with PCP    Date Provider   Last Visit   10/19/2022 Betty Oakes, DO   Next Visit   Visit date not found Betty Oakes, DO   ED visits in past 90 days: 0        Note composed:1:22 PM 10/27/2023          
 Delivery

## 2025-01-23 ENCOUNTER — NON-APPOINTMENT (OUTPATIENT)
Age: 35
End: 2025-01-23